# Patient Record
Sex: MALE | Race: WHITE | Employment: FULL TIME | URBAN - METROPOLITAN AREA
[De-identification: names, ages, dates, MRNs, and addresses within clinical notes are randomized per-mention and may not be internally consistent; named-entity substitution may affect disease eponyms.]

---

## 2019-03-06 RX ORDER — MULTIVITAMIN,THERAPEUTIC
1 TABLET ORAL DAILY
COMMUNITY

## 2019-03-06 RX ORDER — ASPIRIN 81 MG/1
81 TABLET ORAL DAILY
COMMUNITY

## 2019-03-06 RX ORDER — AMLODIPINE BESYLATE 10 MG/1
10 TABLET ORAL DAILY
COMMUNITY

## 2019-03-06 RX ORDER — CLONIDINE HYDROCHLORIDE 0.1 MG/1
0.1 TABLET ORAL 2 TIMES DAILY
COMMUNITY

## 2019-03-06 RX ORDER — POTASSIUM CHLORIDE 750 MG/1
10 CAPSULE, EXTENDED RELEASE ORAL 2 TIMES DAILY
Status: ON HOLD | COMMUNITY
End: 2019-03-07

## 2019-03-07 ENCOUNTER — ANESTHESIA EVENT (OUTPATIENT)
Dept: SURGERY | Facility: CLINIC | Age: 59
End: 2019-03-07
Payer: COMMERCIAL

## 2019-03-07 ENCOUNTER — ANESTHESIA (OUTPATIENT)
Dept: SURGERY | Facility: CLINIC | Age: 59
End: 2019-03-07
Payer: COMMERCIAL

## 2019-03-07 ENCOUNTER — HOSPITAL ENCOUNTER (OUTPATIENT)
Facility: CLINIC | Age: 59
Discharge: HOME | End: 2019-03-07
Attending: OTOLARYNGOLOGY | Admitting: OTOLARYNGOLOGY
Payer: COMMERCIAL

## 2019-03-07 VITALS
BODY MASS INDEX: 30.58 KG/M2 | HEART RATE: 61 BPM | SYSTOLIC BLOOD PRESSURE: 123 MMHG | HEIGHT: 70 IN | RESPIRATION RATE: 16 BRPM | DIASTOLIC BLOOD PRESSURE: 91 MMHG | OXYGEN SATURATION: 91 % | WEIGHT: 213.6 LBS | TEMPERATURE: 97.2 F

## 2019-03-07 DIAGNOSIS — Z98.890 POST-OPERATIVE STATE: ICD-10-CM

## 2019-03-07 DIAGNOSIS — G89.18 POST-OP PAIN: Primary | ICD-10-CM

## 2019-03-07 LAB — POTASSIUM SERPL-SCNC: 3.9 MMOL/L (ref 3.4–5.3)

## 2019-03-07 PROCEDURE — 71000013 ZZH RECOVERY PHASE 1 LEVEL 1 EA ADDTL HR: Performed by: OTOLARYNGOLOGY

## 2019-03-07 PROCEDURE — C1725 CATH, TRANSLUMIN NON-LASER: HCPCS | Performed by: OTOLARYNGOLOGY

## 2019-03-07 PROCEDURE — 25000128 H RX IP 250 OP 636: Performed by: NURSE ANESTHETIST, CERTIFIED REGISTERED

## 2019-03-07 PROCEDURE — 37000008 ZZH ANESTHESIA TECHNICAL FEE, 1ST 30 MIN: Performed by: OTOLARYNGOLOGY

## 2019-03-07 PROCEDURE — 25000128 H RX IP 250 OP 636: Performed by: OTOLARYNGOLOGY

## 2019-03-07 PROCEDURE — 25000125 ZZHC RX 250: Performed by: ANESTHESIOLOGY

## 2019-03-07 PROCEDURE — 25000566 ZZH SEVOFLURANE, EA 15 MIN: Performed by: OTOLARYNGOLOGY

## 2019-03-07 PROCEDURE — 37000009 ZZH ANESTHESIA TECHNICAL FEE, EACH ADDTL 15 MIN: Performed by: OTOLARYNGOLOGY

## 2019-03-07 PROCEDURE — 25000125 ZZHC RX 250: Performed by: NURSE ANESTHETIST, CERTIFIED REGISTERED

## 2019-03-07 PROCEDURE — 25800025 ZZH RX 258: Performed by: OTOLARYNGOLOGY

## 2019-03-07 PROCEDURE — 88304 TISSUE EXAM BY PATHOLOGIST: CPT | Performed by: OTOLARYNGOLOGY

## 2019-03-07 PROCEDURE — 40000169 ZZH STATISTIC PRE-PROCEDURE ASSESSMENT I: Performed by: OTOLARYNGOLOGY

## 2019-03-07 PROCEDURE — 36000075 ZZH SURGERY LEVEL 6 EA 15 ADDTL MIN: Performed by: OTOLARYNGOLOGY

## 2019-03-07 PROCEDURE — 71000012 ZZH RECOVERY PHASE 1 LEVEL 1 FIRST HR: Performed by: OTOLARYNGOLOGY

## 2019-03-07 PROCEDURE — 25800030 ZZH RX IP 258 OP 636: Performed by: NURSE ANESTHETIST, CERTIFIED REGISTERED

## 2019-03-07 PROCEDURE — 88304 TISSUE EXAM BY PATHOLOGIST: CPT | Mod: 26 | Performed by: OTOLARYNGOLOGY

## 2019-03-07 PROCEDURE — 71000027 ZZH RECOVERY PHASE 2 EACH 15 MINS: Performed by: OTOLARYNGOLOGY

## 2019-03-07 PROCEDURE — 36415 COLL VENOUS BLD VENIPUNCTURE: CPT | Performed by: ANESTHESIOLOGY

## 2019-03-07 PROCEDURE — 25000125 ZZHC RX 250: Performed by: OTOLARYNGOLOGY

## 2019-03-07 PROCEDURE — 84132 ASSAY OF SERUM POTASSIUM: CPT | Performed by: ANESTHESIOLOGY

## 2019-03-07 PROCEDURE — 25000132 ZZH RX MED GY IP 250 OP 250 PS 637: Performed by: ANESTHESIOLOGY

## 2019-03-07 PROCEDURE — 27210794 ZZH OR GENERAL SUPPLY STERILE: Performed by: OTOLARYNGOLOGY

## 2019-03-07 PROCEDURE — 36000073 ZZH SURGERY LEVEL 6 1ST 30 MIN: Performed by: OTOLARYNGOLOGY

## 2019-03-07 RX ORDER — KETOROLAC TROMETHAMINE 30 MG/ML
30 INJECTION, SOLUTION INTRAMUSCULAR; INTRAVENOUS EVERY 6 HOURS PRN
Status: DISCONTINUED | OUTPATIENT
Start: 2019-03-07 | End: 2019-03-07 | Stop reason: HOSPADM

## 2019-03-07 RX ORDER — DEXAMETHASONE SODIUM PHOSPHATE 4 MG/ML
INJECTION, SOLUTION INTRA-ARTICULAR; INTRALESIONAL; INTRAMUSCULAR; INTRAVENOUS; SOFT TISSUE PRN
Status: DISCONTINUED | OUTPATIENT
Start: 2019-03-07 | End: 2019-03-07

## 2019-03-07 RX ORDER — NEOSTIGMINE METHYLSULFATE 1 MG/ML
VIAL (ML) INJECTION PRN
Status: DISCONTINUED | OUTPATIENT
Start: 2019-03-07 | End: 2019-03-07

## 2019-03-07 RX ORDER — HYDROMORPHONE HYDROCHLORIDE 1 MG/ML
.3-.5 INJECTION, SOLUTION INTRAMUSCULAR; INTRAVENOUS; SUBCUTANEOUS EVERY 10 MIN PRN
Status: DISCONTINUED | OUTPATIENT
Start: 2019-03-07 | End: 2019-03-07 | Stop reason: HOSPADM

## 2019-03-07 RX ORDER — OXYMETAZOLINE HYDROCHLORIDE 0.05 G/100ML
SPRAY NASAL PRN
Status: DISCONTINUED | OUTPATIENT
Start: 2019-03-07 | End: 2019-03-07 | Stop reason: HOSPADM

## 2019-03-07 RX ORDER — ONDANSETRON 4 MG/1
4 TABLET, ORALLY DISINTEGRATING ORAL EVERY 30 MIN PRN
Status: DISCONTINUED | OUTPATIENT
Start: 2019-03-07 | End: 2019-03-07 | Stop reason: HOSPADM

## 2019-03-07 RX ORDER — DIMENHYDRINATE 50 MG/ML
12.5 INJECTION, SOLUTION INTRAMUSCULAR; INTRAVENOUS
Status: DISCONTINUED | OUTPATIENT
Start: 2019-03-07 | End: 2019-03-07 | Stop reason: HOSPADM

## 2019-03-07 RX ORDER — EPHEDRINE SULFATE 50 MG/ML
INJECTION, SOLUTION INTRAMUSCULAR; INTRAVENOUS; SUBCUTANEOUS PRN
Status: DISCONTINUED | OUTPATIENT
Start: 2019-03-07 | End: 2019-03-07

## 2019-03-07 RX ORDER — SODIUM CHLORIDE, SODIUM LACTATE, POTASSIUM CHLORIDE, CALCIUM CHLORIDE 600; 310; 30; 20 MG/100ML; MG/100ML; MG/100ML; MG/100ML
INJECTION, SOLUTION INTRAVENOUS CONTINUOUS PRN
Status: DISCONTINUED | OUTPATIENT
Start: 2019-03-07 | End: 2019-03-07

## 2019-03-07 RX ORDER — CEFAZOLIN SODIUM 1 G/3ML
1 INJECTION, POWDER, FOR SOLUTION INTRAMUSCULAR; INTRAVENOUS SEE ADMIN INSTRUCTIONS
Status: DISCONTINUED | OUTPATIENT
Start: 2019-03-07 | End: 2019-03-07 | Stop reason: HOSPADM

## 2019-03-07 RX ORDER — DEXAMETHASONE SODIUM PHOSPHATE 4 MG/ML
4 INJECTION, SOLUTION INTRA-ARTICULAR; INTRALESIONAL; INTRAMUSCULAR; INTRAVENOUS; SOFT TISSUE EVERY 10 MIN PRN
Status: DISCONTINUED | OUTPATIENT
Start: 2019-03-07 | End: 2019-03-07 | Stop reason: HOSPADM

## 2019-03-07 RX ORDER — OXYMETAZOLINE HYDROCHLORIDE 0.05 G/100ML
SPRAY NASAL
Status: DISCONTINUED
Start: 2019-03-07 | End: 2019-03-07 | Stop reason: HOSPADM

## 2019-03-07 RX ORDER — MUPIROCIN 20 MG/G
OINTMENT TOPICAL
Status: DISCONTINUED
Start: 2019-03-07 | End: 2019-03-07 | Stop reason: HOSPADM

## 2019-03-07 RX ORDER — LIDOCAINE HYDROCHLORIDE 20 MG/ML
INJECTION, SOLUTION INFILTRATION; PERINEURAL PRN
Status: DISCONTINUED | OUTPATIENT
Start: 2019-03-07 | End: 2019-03-07

## 2019-03-07 RX ORDER — CEFAZOLIN SODIUM 2 G/100ML
2 INJECTION, SOLUTION INTRAVENOUS
Status: COMPLETED | OUTPATIENT
Start: 2019-03-07 | End: 2019-03-07

## 2019-03-07 RX ORDER — ALBUTEROL SULFATE 0.83 MG/ML
2.5 SOLUTION RESPIRATORY (INHALATION)
Status: DISCONTINUED | OUTPATIENT
Start: 2019-03-07 | End: 2019-03-07 | Stop reason: HOSPADM

## 2019-03-07 RX ORDER — CEPHALEXIN 500 MG/1
500 CAPSULE ORAL 3 TIMES DAILY
Qty: 21 CAPSULE | Refills: 0 | Status: SHIPPED | OUTPATIENT
Start: 2019-03-07 | End: 2019-03-14

## 2019-03-07 RX ORDER — ONDANSETRON 2 MG/ML
INJECTION INTRAMUSCULAR; INTRAVENOUS PRN
Status: DISCONTINUED | OUTPATIENT
Start: 2019-03-07 | End: 2019-03-07

## 2019-03-07 RX ORDER — PROPOFOL 10 MG/ML
INJECTION, EMULSION INTRAVENOUS PRN
Status: DISCONTINUED | OUTPATIENT
Start: 2019-03-07 | End: 2019-03-07

## 2019-03-07 RX ORDER — CELECOXIB 200 MG/1
200 CAPSULE ORAL
Status: DISCONTINUED | OUTPATIENT
Start: 2019-03-07 | End: 2019-03-07 | Stop reason: HOSPADM

## 2019-03-07 RX ORDER — GLYCOPYRROLATE 0.2 MG/ML
INJECTION, SOLUTION INTRAMUSCULAR; INTRAVENOUS PRN
Status: DISCONTINUED | OUTPATIENT
Start: 2019-03-07 | End: 2019-03-07

## 2019-03-07 RX ORDER — OXYMETAZOLINE HYDROCHLORIDE 0.05 G/100ML
2 SPRAY NASAL ONCE
Status: COMPLETED | OUTPATIENT
Start: 2019-03-07 | End: 2019-03-07

## 2019-03-07 RX ORDER — MEPERIDINE HYDROCHLORIDE 25 MG/ML
12.5 INJECTION INTRAMUSCULAR; INTRAVENOUS; SUBCUTANEOUS
Status: DISCONTINUED | OUTPATIENT
Start: 2019-03-07 | End: 2019-03-07 | Stop reason: HOSPADM

## 2019-03-07 RX ORDER — MAGNESIUM HYDROXIDE 1200 MG/15ML
LIQUID ORAL PRN
Status: DISCONTINUED | OUTPATIENT
Start: 2019-03-07 | End: 2019-03-07 | Stop reason: HOSPADM

## 2019-03-07 RX ORDER — LORAZEPAM 2 MG/ML
.5-1 INJECTION INTRAMUSCULAR
Status: DISCONTINUED | OUTPATIENT
Start: 2019-03-07 | End: 2019-03-07 | Stop reason: HOSPADM

## 2019-03-07 RX ORDER — FENTANYL CITRATE 50 UG/ML
25-50 INJECTION, SOLUTION INTRAMUSCULAR; INTRAVENOUS EVERY 5 MIN PRN
Status: DISCONTINUED | OUTPATIENT
Start: 2019-03-07 | End: 2019-03-07 | Stop reason: HOSPADM

## 2019-03-07 RX ORDER — DEXAMETHASONE SODIUM PHOSPHATE 10 MG/ML
12 INJECTION, SOLUTION INTRAMUSCULAR; INTRAVENOUS ONCE
Status: DISCONTINUED | OUTPATIENT
Start: 2019-03-07 | End: 2019-03-07 | Stop reason: HOSPADM

## 2019-03-07 RX ORDER — CELECOXIB 200 MG/1
200 CAPSULE ORAL 2 TIMES DAILY
Qty: 20 CAPSULE | Refills: 1 | Status: SHIPPED | OUTPATIENT
Start: 2019-03-07

## 2019-03-07 RX ORDER — NALOXONE HYDROCHLORIDE 0.4 MG/ML
.1-.4 INJECTION, SOLUTION INTRAMUSCULAR; INTRAVENOUS; SUBCUTANEOUS
Status: DISCONTINUED | OUTPATIENT
Start: 2019-03-07 | End: 2019-03-07 | Stop reason: HOSPADM

## 2019-03-07 RX ORDER — IPRATROPIUM BROMIDE AND ALBUTEROL SULFATE 2.5; .5 MG/3ML; MG/3ML
3 SOLUTION RESPIRATORY (INHALATION)
Status: COMPLETED | OUTPATIENT
Start: 2019-03-07 | End: 2019-03-07

## 2019-03-07 RX ORDER — FENTANYL CITRATE 50 UG/ML
INJECTION, SOLUTION INTRAMUSCULAR; INTRAVENOUS PRN
Status: DISCONTINUED | OUTPATIENT
Start: 2019-03-07 | End: 2019-03-07

## 2019-03-07 RX ORDER — MUPIROCIN 20 MG/G
OINTMENT TOPICAL PRN
Status: DISCONTINUED | OUTPATIENT
Start: 2019-03-07 | End: 2019-03-07 | Stop reason: HOSPADM

## 2019-03-07 RX ORDER — SODIUM CHLORIDE, SODIUM LACTATE, POTASSIUM CHLORIDE, CALCIUM CHLORIDE 600; 310; 30; 20 MG/100ML; MG/100ML; MG/100ML; MG/100ML
INJECTION, SOLUTION INTRAVENOUS CONTINUOUS
Status: DISCONTINUED | OUTPATIENT
Start: 2019-03-07 | End: 2019-03-07 | Stop reason: HOSPADM

## 2019-03-07 RX ORDER — ONDANSETRON 2 MG/ML
4 INJECTION INTRAMUSCULAR; INTRAVENOUS EVERY 30 MIN PRN
Status: DISCONTINUED | OUTPATIENT
Start: 2019-03-07 | End: 2019-03-07 | Stop reason: HOSPADM

## 2019-03-07 RX ADMIN — CEFAZOLIN SODIUM 2 G: 2 INJECTION, SOLUTION INTRAVENOUS at 07:47

## 2019-03-07 RX ADMIN — FENTANYL CITRATE 25 MCG: 50 INJECTION, SOLUTION INTRAMUSCULAR; INTRAVENOUS at 09:23

## 2019-03-07 RX ADMIN — DEXAMETHASONE SODIUM PHOSPHATE 12 MG: 4 INJECTION, SOLUTION INTRA-ARTICULAR; INTRALESIONAL; INTRAMUSCULAR; INTRAVENOUS; SOFT TISSUE at 07:45

## 2019-03-07 RX ADMIN — SODIUM CHLORIDE, POTASSIUM CHLORIDE, SODIUM LACTATE AND CALCIUM CHLORIDE: 600; 310; 30; 20 INJECTION, SOLUTION INTRAVENOUS at 07:30

## 2019-03-07 RX ADMIN — SODIUM CHLORIDE, POTASSIUM CHLORIDE, SODIUM LACTATE AND CALCIUM CHLORIDE: 600; 310; 30; 20 INJECTION, SOLUTION INTRAVENOUS at 08:30

## 2019-03-07 RX ADMIN — ROCURONIUM BROMIDE 50 MG: 10 INJECTION INTRAVENOUS at 07:38

## 2019-03-07 RX ADMIN — Medication 10 MG: at 08:05

## 2019-03-07 RX ADMIN — PROPOFOL 180 MG: 10 INJECTION, EMULSION INTRAVENOUS at 07:38

## 2019-03-07 RX ADMIN — FENTANYL CITRATE 50 MCG: 50 INJECTION, SOLUTION INTRAMUSCULAR; INTRAVENOUS at 07:31

## 2019-03-07 RX ADMIN — LIDOCAINE HYDROCHLORIDE 100 MG: 20 INJECTION, SOLUTION INFILTRATION; PERINEURAL at 07:38

## 2019-03-07 RX ADMIN — ONDANSETRON 4 MG: 2 INJECTION INTRAMUSCULAR; INTRAVENOUS at 08:02

## 2019-03-07 RX ADMIN — Medication 1 LOZENGE: at 11:43

## 2019-03-07 RX ADMIN — MIDAZOLAM 2 MG: 1 INJECTION INTRAMUSCULAR; INTRAVENOUS at 07:30

## 2019-03-07 RX ADMIN — IPRATROPIUM BROMIDE AND ALBUTEROL SULFATE 3 ML: .5; 3 SOLUTION RESPIRATORY (INHALATION) at 10:56

## 2019-03-07 RX ADMIN — FENTANYL CITRATE 50 MCG: 50 INJECTION, SOLUTION INTRAMUSCULAR; INTRAVENOUS at 07:38

## 2019-03-07 RX ADMIN — OXYMETAZOLINE HYDROCHLORIDE 2 SPRAY: 5 SPRAY NASAL at 07:12

## 2019-03-07 RX ADMIN — FENTANYL CITRATE 25 MCG: 50 INJECTION, SOLUTION INTRAMUSCULAR; INTRAVENOUS at 09:13

## 2019-03-07 RX ADMIN — NEOSTIGMINE METHYLSULFATE 5 MG: 1 INJECTION, SOLUTION INTRAVENOUS at 09:02

## 2019-03-07 RX ADMIN — GLYCOPYRROLATE 0.8 MG: 0.2 INJECTION, SOLUTION INTRAMUSCULAR; INTRAVENOUS at 09:02

## 2019-03-07 ASSESSMENT — ENCOUNTER SYMPTOMS
SEIZURES: 0
DYSRHYTHMIAS: 0

## 2019-03-07 ASSESSMENT — MIFFLIN-ST. JEOR: SCORE: 1790.13

## 2019-03-07 ASSESSMENT — LIFESTYLE VARIABLES: TOBACCO_USE: 0

## 2019-03-07 ASSESSMENT — COPD QUESTIONNAIRES: COPD: 0

## 2019-03-07 NOTE — ANESTHESIA POSTPROCEDURE EVALUATION
Patient: Pascual Martinez    Procedure(s):  BILATERAL MAXILLARY ANTROSTOMY, ETHMOIDECTOMY, SPHENOIDOTOMY, FRONTAL SINUSOSTOMY WITH IMAGE GUIDANCE    Diagnosis:DEVIATED NASAL SEPTUM, CHRONIC SINUSITIS  Diagnosis Additional Information: No value filed.    Anesthesia Type:  General, ETT    Note:  Anesthesia Post Evaluation    Patient location during evaluation: Bedside  Patient participation: Able to fully participate in evaluation  Level of consciousness: awake and alert  Pain management: adequate  Airway patency: patent  Cardiovascular status: acceptable  Respiratory status: acceptable  Hydration status: acceptable  PONV: none       Comments: Patient with O2 requirement postoperatively, weaned to 4L via face tent, but further weaning gave SpO2 in high-80's.  Lung auscultation revealed bibasilar crackles.  After duoneb and incentive spirometer use, crackles improved with room air SpO2 91-94%.          Last vitals:  Vitals:    03/07/19 1145 03/07/19 1155 03/07/19 1244   BP: 123/77  (!) 123/91   Pulse: 61     Resp: (!) 41 14 16   Temp:      SpO2: 93% 95% 91%         Electronically Signed By: David De Leon MD  March 7, 2019  1:29 PM

## 2019-03-07 NOTE — PROVIDER NOTIFICATION
Pt placed on ear oximeter probe.  Sats 91 to 98% over the last 25 minutes.  Call to Dr. De Leon.  Ok to get patient up and moving forward.

## 2019-03-07 NOTE — BRIEF OP NOTE
[facility]    Brief Operative Note    Pre-operative diagnosis: CHRONIC SINUSITIS  Post-operative diagnosis Same  Procedure: Bilateral Maxillary, Ethmoid, Sphenoid and frontal sinus surgery; use of image guidance  Surgeon: Angel  Anesthesia: General   Estimated blood loss: 40 ml  Drains: None  Specimens: Right and Left Sinus Contents  Findings:   Severe bilateral chronic pan-sinusitis with severe obstructive polyposis.  Complications: None.  Implants: None.

## 2019-03-07 NOTE — OR NURSING
Pt's O2 sats 91-92% on RA. Verified with nani Lowe to discharge. Pt discharged with wife and belongings in stable condition.

## 2019-03-07 NOTE — ANESTHESIA CARE TRANSFER NOTE
Patient: Pascual Martinez    Procedure(s):  BILATERAL MAXILLARY ANTROSTOMY, ETHMOIDECTOMY, SPHENOIDOTOMY, FRONTAL SINUSOSTOMY WITH IMAGE GUIDANCE    Diagnosis: DEVIATED NASAL SEPTUM, CHRONIC SINUSITIS  Diagnosis Additional Information: No value filed.    Anesthesia Type:   General, ETT     Note:  Airway :Face Mask  Patient transferred to:PACU  Handoff Report: Identifed the Patient, Identified the Reponsible Provider, Reviewed the pertinent medical history, Discussed the surgical course, Reviewed Intra-OP anesthesia mangement and issues during anesthesia, Set expectations for post-procedure period and Allowed opportunity for questions and acknowledgement of understanding      Vitals: (Last set prior to Anesthesia Care Transfer)    CRNA VITALS  3/7/2019 0854 - 3/7/2019 0929      3/7/2019             Pulse:  63    SpO2:  97 %    Resp Rate (set):  10                Electronically Signed By: BO Quintero CRNA  March 7, 2019  9:29 AM

## 2019-03-07 NOTE — OR NURSING
Dr. De Leon MDA at bedside and aware of inability to wean off oxygen. Lung sounds equal bilaterally with a few crackles noted. Patient denies SOB of any difficulty breathing. Incentive spirometer used well up to 2500ml. Continue to monitor.

## 2019-03-07 NOTE — ANESTHESIA PREPROCEDURE EVALUATION
Anesthesia Pre-Procedure Evaluation    Patient: Pascual Martinez   MRN: [id number] : 1960          Preoperative Diagnosis: DEVIATED NASAL SEPTUM, CHRONIC SINUSITIS    Procedure(s):  ENDOSCOPIC ETHOMID MAXILLARY PHENOID SINUS SURGERY (NAVIGATION, ACCLARENT BALLOON, C-ARM)    Past Medical History:   Diagnosis Date     Gout      Hypertension      Sleep apnea     CPAP     Past Surgical History:   Procedure Laterality Date     COLONOSCOPY         Anesthesia Evaluation     . Pt has had prior anesthetic.     No history of anesthetic complications          ROS/MED HX    ENT/Pulmonary:     (+)sleep apnea, uses CPAP , . .   (-) tobacco use, asthma and COPD   Neurologic:      (-) seizures, CVA and TIA   Cardiovascular:     (+) hypertension----. : . . . :. . Previous cardiac testing date:results:Stress Testdate: results: FINAL CONCLUSIONS   Exercise did not seem to reproduce presenting chest pain or symptoms.   Good exercise tolerance.   Artifact noted   Stress ECG, echo is negative for myocardial ischemia. date: results: date: results:         (-) CAD, CHF and arrhythmias   METS/Exercise Tolerance:     Hematologic:         Musculoskeletal: Comment: Gout  (+) arthritis, , , -       GI/Hepatic:        (-) GERD and liver disease   Renal/Genitourinary:      (-) renal disease   Endo:      (-) Type I DM and Type II DM   Psychiatric:         Infectious Disease:         Malignancy:         Other:                          Physical Exam  Normal systems: dental    Airway   Mallampati: II  TM distance: >3 FB  Neck ROM: full    Dental     Cardiovascular   Rhythm and rate: regular      Pulmonary    breath sounds clear to auscultation            No results found for: WBC, HGB, HCT, PLT, CRP, SED, NA, POTASSIUM, CHLORIDE, CO2, BUN, CR, GLC, DARLIN, PHOS, MAG, ALBUMIN, PROTTOTAL, ALT, AST, GGT, ALKPHOS, BILITOTAL, BILIDIRECT, LIPASE, AMYLASE, SANTIAGO, PTT, INR, FIBR, TSH, T4, T3, HCG, HCGS, CKTOTAL, CKMB, TROPN    Preop  Vitals  BP Readings from Last 3 Encounters:   07/04/13 128/71    Pulse Readings from Last 3 Encounters:   07/04/13 67      Resp Readings from Last 3 Encounters:   07/04/13 14    SpO2 Readings from Last 3 Encounters:   07/04/13 99%      Temp Readings from Last 1 Encounters:   07/04/13 36.7  C (98.1  F) (Oral)    Ht Readings from Last 1 Encounters:   No data found for Ht      Wt Readings from Last 1 Encounters:   No data found for Wt    There is no height or weight on file to calculate BMI.       Anesthesia Plan      History & Physical Review  History and physical reviewed and following examination; no interval change.    ASA Status:  2 .    NPO Status:  > 8 hours    Plan for General and ETT with Intravenous and Propofol induction. Maintenance will be Balanced.    PONV prophylaxis:  Ondansetron (or other 5HT-3) and Dexamethasone or Solumedrol       Postoperative Care  Postoperative pain management:  Multi-modal analgesia.      Consents  Anesthetic plan, risks, benefits and alternatives discussed with:  Patient..                 David De Leon MD

## 2019-03-07 NOTE — OP NOTE
PREOPERATIVE DIAGNOSES:   1. Bilateral maxillary sinusitis, chronic.   2. Bilateral chronic ethmoid sinusitis.   3. Bilateral chronic frontal sinusitis.   4. Bilateral chronic sphenoid sinusitis.   POSTOPERATIVE DIAGNOSIS:   1. Bilateral maxillary sinusitis, chronic.   2. Bilateral chronic ethmoid sinusitis.   3. Bilateral chronic frontal sinusitis.   4. Bilateral chronic sphenoid sinusitis.   PROCEDURES:   1. Bilateral maxillary antroscopy with removal of polypoid tissue.   2. Bilateral ethmoidectomy.   3. Bilateral frontal sinusotomy.   4. Bilateral sphenoid sinusotomy with removal polypoid tissue.   5. Use of image guidance.     ANESTHESIA: General.   ESTIMATED BLOOD LOSS: 40 mL.   SPECIMENS: Right and left sinus contents.   COMPLICATIONS: None.   FINDINGS: Chronic polypoid degeneration of the nasal mucosa with narrowing of the ostiomeatal complexes and middle meati bilaterally. Bilateral Frontal and Sphenoid opacification and extensive pan-sinusitis and polyposis.    OPERATIVE INDICATIONS:Pascual Martinez is a 58 year old male with a history of chronic and recurrent sinusitis. He has been treated with multiple courses of antibiotic therapy and topical nasal medication. He had remote polypectomy without complete ESS.  He continues to have symptoms in the distribution of the above sinuses including mid facial pressure, frontal headache and a decision was made to proceed with the above procedure.     OPERATIVE PROCEDURE: The patient was met in preinduction. Risks, benefits and limitations were reviewed at length. The pertinent questions were answered and consent was obtained. From there, the patient was taken to the operating room, she was placed in supine position. General anesthetic was initiated. The patient was endotracheally intubated and appropriate ventilation established. The patient was prepped and draped for the above procedure. 1% lidocaine with 1:100,000 epinephrine was infiltrated into the  lateral nasal wall mucosa and heads of the middle turbinates bilaterally. Topical oxymetazoline distributed on cotton pledgets was then placed intranasally.     Next, under endoscopic visualization, the right sphenoid sinus was addressed. A sphenoid sinus catheter was placed medial to the head of the middle turbinate and then advanced into the sphenoid sinus under image guidance. The sphenoid ostia was noted to be stenotic.  A balloon catheter spanning the sphenoid sinus ostium was then dilated. The catheter was then removed and a nasal endoscope was use to visualize the sphenoid recess where polyps were excised with image-guided sinus shaver.  Next, the same procedure was performed on the left side where again under endoscopic visualization, a sphenoid sinus catheter was placed medial to the head of the middle turbinate. A  balloon catheter spanning the sphenoid sinus ostium was then dilated. The catheter was then removed and a nasal endoscope was use to visualize the sphenoid recess where polyps were excised with image-guided sinus shaver.       Next, under endoscopic visualization, the right frontal sinus was addressed. A frontal sinus balloon catheter was placed lateral to the head of the middle turbinate and advanced into the frontal sinus under image-guidance. The frontal ostia was noted to be quite narrow. The balloon catheter was advanced to span the frontal sinus ostium. This was then dilated both distally and proximally to open the duct. The catheters were removed and an angled telescope was used to visualize the frontal recess. Giraffe frontal sinus forceps were then used to remove polypoid tissue from the frontal recess. Next, the same procedure was performed on the left side where again under endoscopic visualization, a frontal sinus catheter was placed lateral to the head of the middle turbinate. The  frontal sinus balloon catheter was placed lateral to the head of the middle turbinate and advanced  into the frontal sinus under image-guidance. The frontal ostia was noted to be quite senotic. The balloon catheter was advanced to span the frontal sinus ostium. This was then dilated both distally and proximally to open the duct. The catheters were removed and an angled telescope was used to visualize the frontal recess. Giraffe frontal sinus forceps were then used to remove polypoid tissue from the frontal recess. These were all then removed. Again using an angled telescope and using standard frontal sinus instruments, giraffe forceps were used to remove the bony spiculation and polypoid tissue from the frontal recess. An inspection of both the right and left frontal recesses noted that these were nice and patent and good visualization into the frontal sinus was achieved.    Attention was then returned to the right where the uncinate process was medialized with a sinus seeker. Uncinectomy was performed with a Hampton elevator and the uncinate removed with Blakesley forceps. An upbiting Blakesley forceps were then used to further open the maxillary antrum. Backbiting forceps were further and a sinus shaver was used to remove additional polypoid tissue from the maxillary antrum. Using a combination of Arita tip suction and Blakesley forceps, the anterior and posterior ethmoid air cells were then taken down and extensive polyposis excised.  Nimesh forceps were then used to remove polypoid tissue from the sphenoid sinus.  Attention was then turned to the left where in the same fashion, the uncinate was medialized with a sinus seeker. Uncinectomy performed with a Hampton elevator and the uncinate process was removed with a Blakesley forceps. The maxillary antrum was opened further with upbiting Blakesley forceps, backbiting forceps and sinus shaver. Next, the ethmoid bulla was taken down with a Arita tip suction and anterior and posterior ethmoid air cells were opened with Blakesley forceps. Polyps were excised from  the ethmoid.  An inspection of both the right and left sphenoid recesses noted that these were nice and patent and good visualization into the sphenoid sinus was achieved.    Next, Telfa coated with Bactroban ointment was placed intranasally for complete hemostasis. The procedure was then concluded. There were no intraoperative complications. The patient was then returned to the care of the Anesthesia Service.

## 2019-03-07 NOTE — DISCHARGE INSTRUCTIONS
Same Day Surgery Discharge Instructions for  Sedation and General Anesthesia       It's not unusual to feel dizzy, light-headed or faint for up to 24 hours after surgery or while taking pain medication.  If you have these symptoms: sit for a few minutes before standing and have someone assist you when you get up to walk or use the bathroom.      You should rest and relax for the next 24 hours. We recommend you make arrangements to have an adult stay with you for at least 24 hours after your discharge.  Avoid hazardous and strenuous activity.      DO NOT DRIVE any vehicle or operate mechanical equipment for 24 hours following the end of your surgery.  Even though you may feel normal, your reactions may be affected by the medication you have received.      Do not drink alcoholic beverages for 24 hours following surgery.       Slowly progress to your regular diet as you feel able. It's not unusual to feel nauseated and/or vomit after receiving anesthesia.  If you develop these symptoms, drink clear liquids (apple juice, ginger ale, broth, 7-up, etc. ) until you feel better.  If your nausea and vomiting persists for 24 hours, please notify your surgeon.        All narcotic pain medications, along with inactivity and anesthesia, can cause constipation. Drinking plenty of liquids and increasing fiber intake will help.      For any questions of a medical nature, call your surgeon.      Do not make important decisions for 24 hours.      If you had general anesthesia, you may have a sore throat for a couple of days related to the breathing tube used during surgery.  You may use Cepacol lozenges to help with this discomfort.  If it worsens or if you develop a fever, contact your surgeon.       If you feel your pain is not well managed with the pain medications prescribed by your surgeon, please contact your surgeon's office to let them know so they can address your concerns.       [facility]  Post Op Nasal  Surgery Discharge Instructions   MANA Barber, FUENTES Garcia, HARMAN Byrne      Depending on the type of nasal surgery you have had, you may have some nasal packing in your nose.  The packing is placed in each nasal passage to provide a little pressure to reduce bleeding following nasal surgery.  This may be removed the day following surgery or may be absorbable, requiring no removal.    Any tape on the nose will usually be removed the day after surgery, but in certain types of nasal surgery some tape and a cast may remain on the nose for one week.    Following nasal surgery, it is important not to blow the nose.  Blowing the nose will very often blow off a scab or crust, and like picking off any scab or crust, it may start bleeding.  While the bleeding is seldom serious, it is certainly a nuisance and a mess.  Occasionally, heavy bleeding may be encountered, and then nasal packing must be re-inserted into the nose to try to control this bleeding.  It is, therefore, better to sniffle like children do rather than blow your nose.  Try not to sneeze through your nose, but cough it out if you feel a sneeze coming on. Bending and strenuous lifting should also be avoided for the same reason.    There may be stitches inside your nose; these will dissolve and go away by themselves; they look like a small piece of fishline when they are wiped out, and this will often occur a week after surgery.    The use of some ointment or Vaseline in the nose is often helpful to soften some of the crusts.  The ointment is inserted by squirting a pea-sized glob on the end of a finger and simply squishing it into each nostril, and then sometimes sniffing it in.  If this is done prior to lying down, the body temperature will melt the ointment and help it run back into the nasal passages where it can moisten and soften some of the crusts which are then easier to clear out by sniffing.    If there is tape on the nose, it  is best to try to keep the nose fairly dry.  Once all of the tape has been removed from the nose, it is perfectly all right to shower and swim.    Depending on the type of surgery you have had, we will want you to call the office for an appointment to be seen sometime in the next 4-10 days.      [facility]  [phone]                                            CALL OFFICE FOR PACKING REMOVAL ON Monday

## 2019-03-08 LAB — COPATH REPORT: NORMAL

## (undated) DEVICE — TUBING IRRIGATOR STRAIGHTSHOT XPS 1895522

## (undated) DEVICE — NDL 25GA 1.5" 305127

## (undated) DEVICE — LINEN TOWEL PACK X5 5464

## (undated) DEVICE — SUCTION CANISTER MEDIVAC LINER 3000ML W/LID 65651-530

## (undated) DEVICE — TRACKER ENT OTS INSTRUMENT FUSION 9733533

## (undated) DEVICE — SYR 03ML LL W/O NDL 309657

## (undated) DEVICE — Device

## (undated) DEVICE — SYR 50ML LL W/O NDL 309653

## (undated) DEVICE — PACK NASAL SINUS SEN15NSFSF

## (undated) DEVICE — KIT INFLATOR BALLOON 18INFKIT

## (undated) DEVICE — NDL 27GA 1.25" 305136

## (undated) DEVICE — SINUS BALLOON EM SEEKER 6.0X17.0MM 70DEG 1830617FRT70

## (undated) DEVICE — TRACKER PATIENT NON-INVASIVE AXIEM 9734887XOM

## (undated) DEVICE — ANTIFOG SOLUTION W/FOAM PAD 31142527

## (undated) DEVICE — DRSG TELFA 3X8" 1238

## (undated) DEVICE — SOL WATER IRRIG 1000ML BOTTLE 2F7114

## (undated) DEVICE — TUBING SUCTION 12"X1/4" N612

## (undated) DEVICE — GLOVE PROTEXIS POWDER FREE SMT 7.5  2D72PT75X

## (undated) DEVICE — SPONGE COTTONOID 1/2X3" 80-1407

## (undated) DEVICE — DECANTER VIAL 2006S

## (undated) DEVICE — BLADE QUADCUT ROTATABLE FUSION 4.3MMX13CM M4 1884380EM

## (undated) DEVICE — SOL NACL 0.9% IRRIG 1000ML BOTTLE 07138-09

## (undated) RX ORDER — GLYCOPYRROLATE 0.2 MG/ML
INJECTION, SOLUTION INTRAMUSCULAR; INTRAVENOUS
Status: DISPENSED
Start: 2019-03-07

## (undated) RX ORDER — IPRATROPIUM BROMIDE AND ALBUTEROL SULFATE 2.5; .5 MG/3ML; MG/3ML
SOLUTION RESPIRATORY (INHALATION)
Status: DISPENSED
Start: 2019-03-07

## (undated) RX ORDER — DEXAMETHASONE SODIUM PHOSPHATE 4 MG/ML
INJECTION, SOLUTION INTRA-ARTICULAR; INTRALESIONAL; INTRAMUSCULAR; INTRAVENOUS; SOFT TISSUE
Status: DISPENSED
Start: 2019-03-07

## (undated) RX ORDER — PROPOFOL 10 MG/ML
INJECTION, EMULSION INTRAVENOUS
Status: DISPENSED
Start: 2019-03-07

## (undated) RX ORDER — FENTANYL CITRATE 50 UG/ML
INJECTION, SOLUTION INTRAMUSCULAR; INTRAVENOUS
Status: DISPENSED
Start: 2019-03-07

## (undated) RX ORDER — LIDOCAINE HYDROCHLORIDE 20 MG/ML
INJECTION, SOLUTION EPIDURAL; INFILTRATION; INTRACAUDAL; PERINEURAL
Status: DISPENSED
Start: 2019-03-07

## (undated) RX ORDER — OXYMETAZOLINE HYDROCHLORIDE 0.05 G/100ML
SPRAY NASAL
Status: DISPENSED
Start: 2019-03-07

## (undated) RX ORDER — NEOSTIGMINE METHYLSULFATE 1 MG/ML
VIAL (ML) INJECTION
Status: DISPENSED
Start: 2019-03-07

## (undated) RX ORDER — ONDANSETRON 2 MG/ML
INJECTION INTRAMUSCULAR; INTRAVENOUS
Status: DISPENSED
Start: 2019-03-07

## (undated) RX ORDER — CEFAZOLIN SODIUM 2 G/100ML
INJECTION, SOLUTION INTRAVENOUS
Status: DISPENSED
Start: 2019-03-07